# Patient Record
Sex: MALE | Race: WHITE | NOT HISPANIC OR LATINO | Employment: UNEMPLOYED | URBAN - METROPOLITAN AREA
[De-identification: names, ages, dates, MRNs, and addresses within clinical notes are randomized per-mention and may not be internally consistent; named-entity substitution may affect disease eponyms.]

---

## 2023-04-06 ENCOUNTER — OFFICE VISIT (OUTPATIENT)
Dept: FAMILY MEDICINE CLINIC | Facility: CLINIC | Age: 48
End: 2023-04-06

## 2023-04-06 VITALS
OXYGEN SATURATION: 97 % | HEART RATE: 57 BPM | TEMPERATURE: 98.2 F | HEIGHT: 72 IN | BODY MASS INDEX: 23.03 KG/M2 | SYSTOLIC BLOOD PRESSURE: 133 MMHG | DIASTOLIC BLOOD PRESSURE: 82 MMHG | WEIGHT: 170 LBS

## 2023-04-06 DIAGNOSIS — M77.02 MEDIAL EPICONDYLITIS OF ELBOW, LEFT: ICD-10-CM

## 2023-04-06 DIAGNOSIS — B35.1 TOENAIL FUNGUS: ICD-10-CM

## 2023-04-06 DIAGNOSIS — G89.29 OTHER CHRONIC PAIN: ICD-10-CM

## 2023-04-06 DIAGNOSIS — Z13.220 SCREENING, LIPID: ICD-10-CM

## 2023-04-06 DIAGNOSIS — R53.83 OTHER FATIGUE: ICD-10-CM

## 2023-04-06 DIAGNOSIS — M25.50 ARTHRALGIA OF MULTIPLE JOINTS: Primary | ICD-10-CM

## 2023-04-06 DIAGNOSIS — Z13.29 SCREENING FOR THYROID DISORDER: ICD-10-CM

## 2023-04-06 DIAGNOSIS — Z11.59 ENCOUNTER FOR HEPATITIS C SCREENING TEST FOR LOW RISK PATIENT: ICD-10-CM

## 2023-04-06 DIAGNOSIS — F32.2 SEVERE DEPRESSION (HCC): ICD-10-CM

## 2023-04-06 DIAGNOSIS — Z11.4 SCREENING FOR HIV WITHOUT PRESENCE OF RISK FACTORS: ICD-10-CM

## 2023-04-06 DIAGNOSIS — Z12.11 ENCOUNTER FOR SCREENING COLONOSCOPY: ICD-10-CM

## 2023-04-06 DIAGNOSIS — Z13.0 SCREENING FOR DEFICIENCY ANEMIA: ICD-10-CM

## 2023-04-06 DIAGNOSIS — Z13.1 SCREENING FOR DIABETES MELLITUS: ICD-10-CM

## 2023-04-06 NOTE — ASSESSMENT & PLAN NOTE
Check lyme, ARELY, CRP  Discussed fibromyalgia is more a diagnosis of exclusion  Can try Cymbalta to help with pain and depression- would like to wait until labs are completed

## 2023-04-06 NOTE — ASSESSMENT & PLAN NOTE
No suicidal thoughts  Likely caused by pain and fatigue he agrees- he is upset he doesn't feel well all the time  Discussed medications and he declines at this time, we will work up other symptoms and treat those to see if depression improves

## 2023-04-06 NOTE — PROGRESS NOTES
Name: Luzmaria Card      : 1975      MRN: [id number]  Encounter Provider: EVA Decker  Encounter Date: 2023   Encounter department: [facility]     1  Arthralgia of multiple joints  Assessment & Plan:  Check lyme, ARELY, CRP  Discussed fibromyalgia is more a diagnosis of exclusion  Can try Cymbalta to help with pain and depression- would like to wait until labs are completed    Orders:  -     Lyme Disease Serology w/Reflex; Future  -     ARELY w/Reflex if Positive; Future  -     C-reactive protein; Future  -     Lyme Disease Serology w/Reflex  -     ARELY w/Reflex if Positive  -     C-reactive protein    2  Other fatigue  Assessment & Plan:  Check labs could be multi-factorial with pain, depression  Check labs    Orders:  -     Lyme Disease Serology w/Reflex; Future  -     C-reactive protein; Future  -     Lyme Disease Serology w/Reflex  -     C-reactive protein    3  Toenail fungus  Assessment & Plan: Thickened, yellow nails on left foot  Check liver tests  To consider terbinafine if normal LFTs      4  Medial epicondylitis of elbow, left  Assessment & Plan:  Wear elbow strap for counter pressure  Avoid repetitive movements      5  Other chronic pain  Assessment & Plan:  Discussed cymbalta for treatment of depression and pain  Would like to hold off on medications until lab results are received      6  Encounter for screening colonoscopy  -     Ambulatory referral for colonoscopy; Future    7  Screening, lipid  -     Lipid panel; Future  -     Lipid panel    8  Screening for diabetes mellitus  -     Comprehensive metabolic panel; Future  -     Comprehensive metabolic panel    9  Screening for deficiency anemia  -     CBC and differential; Future  -     CBC and differential    10  Screening for thyroid disorder  -     TSH, 3rd generation with Free T4 reflex; Future  -     TSH, 3rd generation with Free T4 reflex    11   Screening for HIV without presence of risk factors  -     HIV-1 RNA, quantitative, PCR; Future  -     HIV-1 RNA, quantitative, PCR    12  Encounter for hepatitis C screening test for low risk patient  -     Hepatitis C RNA, quantitative, PCR; Future  -     Hepatitis C RNA, quantitative, PCR    13  Severe depression ([address] )  Assessment & Plan:  No suicidal thoughts  Likely caused by pain and fatigue he agrees- he is upset he doesn't feel well all the time  Discussed medications and he declines at this time, we will work up other symptoms and treat those to see if depression improves        Depression Screening and Follow-up Plan: Patient's depression screening was positive with a PHQ-2 score of 5  Their PHQ-9 score was 20  Depression likely due to other medical condition  Will treat underlying condition  He is overwhelmed from his physical ailments  No suicidal thoughts or ideations, has never had a plan to hurt himself  Subjective      Here today as a new patient, establishing care  Has multiple complaints to review  Hasnt seen a provider in years  Has a history of lyme, bulls eye rash and two weeks of antibiotics given, 1st episode 2007, and then again in about 2014 want to get his lyme done pend his blood work  Tired all the time     Has had two doctors tell him he has fibromyalgia but has never been fully or officially diagnosed    Dealing with his sinus and always congested at night and takes the allergies medicine- allergies all the time  Has been taking benadryl has tried non drowsy ones as well without improvement  Doesn't use nasal sprays consistently  Didn't like the taste of them when he would use them  Gets recurring sinus infections  Most recently 3-4 weeks ago  Has never had allergy testing done  Saw a holistic doctor in past and was tested for allergies- with some food allergies- artifical sweeteners        Left foot toenail fungus has never been treated in the past  Spoke with doctor about treatment but has avoided due to GI symptoms at the time  Been going to the gym left elbow hurts been going on years- tries to exercise 4-5x weekly  Has pain in left elbow and forearm, hurts with flexion at elbow  He tries to take probioitc daily- has EGD in past for hiatal hernia years ago  Has tries to monitor his diet to avoid triggers  Used to be on nexium but feels he is managing it well  Review of Systems   Constitutional: Positive for fatigue  Negative for fever  HENT: Positive for congestion, postnasal drip, rhinorrhea, sinus pressure and sneezing  Respiratory: Negative  Cardiovascular: Negative  Gastrointestinal: Negative  Occasional dyspepsia   Genitourinary: Negative  Musculoskeletal: Negative  Neurological: Negative  Hematological: Negative  Psychiatric/Behavioral: Positive for dysphoric mood  Negative for sleep disturbance  The patient is nervous/anxious  No current outpatient medications on file prior to visit  Objective     /82   Pulse 57   Temp 98 2 °F (36 8 °C) (Tympanic)   Ht 6' (1 829 m)   Wt 77 1 kg (170 lb)   SpO2 97%   BMI 23 06 kg/m²     Physical Exam  Vitals and nursing note reviewed  Constitutional:       Appearance: Normal appearance  He is normal weight  HENT:      Head: Normocephalic  Right Ear: Tympanic membrane, ear canal and external ear normal       Nose: Nose normal       Mouth/Throat:      Mouth: Mucous membranes are moist       Pharynx: Oropharynx is clear  Eyes:      Extraocular Movements: Extraocular movements intact  Conjunctiva/sclera: Conjunctivae normal       Pupils: Pupils are equal, round, and reactive to light  Cardiovascular:      Rate and Rhythm: Normal rate and regular rhythm  Pulses:           Radial pulses are 1+ on the right side and 1+ on the left side  Heart sounds: Normal heart sounds  Pulmonary:      Effort: Pulmonary effort is normal       Breath sounds: Normal breath sounds     Abdominal: General: Abdomen is flat  Bowel sounds are normal       Palpations: Abdomen is soft  Musculoskeletal:      Cervical back: Normal range of motion  Right lower leg: No edema  Left lower leg: No edema  Lymphadenopathy:      Cervical: No cervical adenopathy  Skin:     General: Skin is warm and dry  Capillary Refill: Capillary refill takes less than 2 seconds  Neurological:      Mental Status: He is alert and oriented to person, place, and time     Psychiatric:         Mood and Affect: Mood normal          Behavior: Behavior normal        Lucia Alba

## 2023-04-06 NOTE — ASSESSMENT & PLAN NOTE
Discussed cymbalta for treatment of depression and pain  Would like to hold off on medications until lab results are received

## 2023-11-25 LAB — HEMOCCULT STL QL IA: NEGATIVE

## 2023-11-27 ENCOUNTER — TELEPHONE (OUTPATIENT)
Dept: FAMILY MEDICINE CLINIC | Facility: CLINIC | Age: 48
End: 2023-11-27

## 2023-11-27 NOTE — TELEPHONE ENCOUNTER
----- Message from Helga Mcmullen DO sent at 11/27/2023  4:49 PM EST -----  No mychart  His stool test is negative.  Repeat in 1 year

## 2023-12-28 ENCOUNTER — OFFICE VISIT (OUTPATIENT)
Dept: FAMILY MEDICINE CLINIC | Facility: CLINIC | Age: 48
End: 2023-12-28
Payer: COMMERCIAL

## 2023-12-28 VITALS
HEART RATE: 77 BPM | SYSTOLIC BLOOD PRESSURE: 142 MMHG | BODY MASS INDEX: 26.14 KG/M2 | HEIGHT: 72 IN | WEIGHT: 193 LBS | OXYGEN SATURATION: 97 % | DIASTOLIC BLOOD PRESSURE: 75 MMHG | TEMPERATURE: 97.3 F

## 2023-12-28 DIAGNOSIS — M25.50 ARTHRALGIA OF MULTIPLE JOINTS: ICD-10-CM

## 2023-12-28 DIAGNOSIS — Z00.00 ANNUAL PHYSICAL EXAM: Primary | ICD-10-CM

## 2023-12-28 DIAGNOSIS — D22.9 MULTIPLE ATYPICAL NEVI: ICD-10-CM

## 2023-12-28 DIAGNOSIS — R53.83 OTHER FATIGUE: ICD-10-CM

## 2023-12-28 DIAGNOSIS — F32.2 SEVERE DEPRESSION (HCC): ICD-10-CM

## 2023-12-28 DIAGNOSIS — G89.29 OTHER CHRONIC PAIN: ICD-10-CM

## 2023-12-28 PROCEDURE — 99214 OFFICE O/P EST MOD 30 MIN: CPT | Performed by: NURSE PRACTITIONER

## 2023-12-28 PROCEDURE — 99396 PREV VISIT EST AGE 40-64: CPT | Performed by: NURSE PRACTITIONER

## 2023-12-28 NOTE — ASSESSMENT & PLAN NOTE
Discussed cymbalta for treatment of depression and pain  Would like to hold off on medications until lab results are received   English

## 2023-12-28 NOTE — ASSESSMENT & PLAN NOTE
Did not check blood work ordered in April Check lyme, ARELY, CRP  Discussed fibromyalgia is more a diagnosis of exclusion  Can try Cymbalta to help with pain and depression-not interested in medication

## 2023-12-28 NOTE — PROGRESS NOTES
ADULT ANNUAL PHYSICAL  [facility]    NAME: Rani Alex  AGE: 48 y.o. SEX: male  : 1975     DATE: 2023     Assessment and Plan:     Problem List Items Addressed This Visit        Musculoskeletal and Integument    Multiple atypical nevi     Schedule with dermatology         Relevant Orders    Ambulatory Referral to Dermatology       Other    Arthralgia of multiple joints     Did not check blood work ordered in April Check lyme, ARELY, CRP  Discussed fibromyalgia is more a diagnosis of exclusion  Can try Cymbalta to help with pain and depression-not interested in medication         Other fatigue     Check labs could be multi-factorial with pain, depression  Check labs         Other chronic pain     Discussed cymbalta for treatment of depression and pain  Would like to hold off on medications until lab results are received         Severe depression (HCC)     No suicidal thoughts  Likely caused by pain and fatigue he agrees- he is upset he doesn't feel well all the time  Discussed medications and he declines at this time, we will work up other symptoms and treat those to see if depression improves        Other Visit Diagnoses     Annual physical exam    -  Primary    BMI 26.0-26.9,adult                Immunizations and preventive care screenings were discussed with patient today. Appropriate education was printed on patient's after visit summary.      Counseling:  Alcohol/drug use: discussed moderation in alcohol intake, the recommendations for healthy alcohol use, and avoidance of illicit drug use.  Dental Health: discussed importance of regular tooth brushing, flossing, and dental visits.  Injury prevention: discussed safety/seat belts, safety helmets, smoke detectors, carbon dioxide detectors, and smoking near bedding or upholstery.  Exercise: the importance of regular exercise/physical activity was discussed. Recommend exercise 3-5 times per week for  at least 30 minutes.     BMI Counseling: Body mass index is 26.18 kg/m². The BMI is above normal. Nutrition recommendations include encouraging healthy choices of fruits and vegetables, increasing intake of lean protein and reducing intake of cholesterol. Exercise recommendations include exercising 3-5 times per week and strength training exercises. Rationale for BMI follow-up plan is due to patient being overweight or obese.         No follow-ups on file.     Chief Complaint:     Chief Complaint   Patient presents with   • Physical Exam     Physical, mole on back and want to get it check       History of Present Illness:     Adult Annual Physical   Patient here for a comprehensive physical exam. The patient reports problems - multiple aches and pains.  .    Felt really good over the summer with muscle and joint pain- energy levels were great. Now that it is winter he has more joint pain, fatigue, he is cold all the time. Doesn't really want to take anything for the pain like cymbalta. Constant energy deficit. Doesn't sleep well.     Feels he can take better care of himself. He has gained weight in a healthy way.   Did not complete blood work from     Hasn't seen dermatology in 8-10 years in [place] when he came back from [place]. Has noticed some skin changes on his back in last 2-3 years-    Diet and Physical Activity  Diet/Nutrition: well balanced diet, consuming 3-5 servings of fruits/vegetables daily, and has tried to cut back on sugars and white flour .   Exercise:  was more active in the summer  .      Depression Screening  PHQ-2/9 Depression Screening    Little interest or pleasure in doing things: 0 - not at all  Feeling down, depressed, or hopeless: 1 - several days  Trouble falling or staying asleep, or sleeping too much: 2 - more than half the days  Feeling tired or having little energy: 2 - more than half the days  Poor appetite or overeatin - not at all  Feeling bad about yourself - or that  you are a failure or have let yourself or your family down: 2 - more than half the days  Trouble concentrating on things, such as reading the newspaper or watching television: 0 - not at all  Moving or speaking so slowly that other people could have noticed. Or the opposite - being so fidgety or restless that you have been moving around a lot more than usual: 0 - not at all  Thoughts that you would be better off dead, or of hurting yourself in some way: 1 - several days  PHQ-9 Score: 8   PHQ-9 Interpretation: Mild depression        General Health  Sleep: sleeps poorly and gets 4-6 hours of sleep on average.   Hearing: normal - bilateral.  Vision: previous LASIK surgery.   Dental: regular dental visits.        Health  Symptoms include: none       Review of Systems:     Review of Systems   Constitutional:  Positive for activity change and fatigue. Negative for appetite change and fever.   HENT:  Negative for congestion, postnasal drip, rhinorrhea, sinus pressure and sneezing.    Respiratory: Negative.     Cardiovascular: Negative.    Gastrointestinal: Negative.         Occasional dyspepsia   Genitourinary: Negative.    Musculoskeletal:  Positive for arthralgias, back pain and myalgias.   Skin:  Positive for color change.   Neurological: Negative.    Hematological: Negative.    Psychiatric/Behavioral:  Positive for dysphoric mood. Negative for sleep disturbance. The patient is nervous/anxious.       Past Medical History:     Past Medical History:   Diagnosis Date   • Fibromyalgia    • Hiatal hernia    • Lyme disease    • Scoliosis       Past Surgical History:     History reviewed. No pertinent surgical history.   Family History:     Family History   Problem Relation Age of Onset   • Skin cancer Father       Social History:     Social History     Socioeconomic History   • Marital status: Single     Spouse name: None   • Number of children: None   • Years of education: None   • Highest education level: None    Occupational History   • None   Tobacco Use   • Smoking status: Never     Passive exposure: Never   • Smokeless tobacco: Never   Vaping Use   • Vaping status: Every Day   Substance and Sexual Activity   • Alcohol use: Yes     Comment: social   • Drug use: Yes     Types: Marijuana   • Sexual activity: Yes     Partners: Female   Other Topics Concern   • None   Social History Narrative   • None     Social Determinants of Health     Financial Resource Strain: Not on file   Food Insecurity: Not on file   Transportation Needs: Not on file   Physical Activity: Not on file   Stress: Not on file   Social Connections: Not on file   Intimate Partner Violence: Not on file   Housing Stability: Not on file      Current Medications:     No current outpatient medications on file.     No current facility-administered medications for this visit.      Allergies:     No Known Allergies   Physical Exam:     /75   Pulse 77   Temp (!) 97.3 °F (36.3 °C) (Tympanic)   Ht 6' (1.829 m)   Wt 87.5 kg (193 lb)   SpO2 97%   BMI 26.18 kg/m²     Physical Exam  Vitals and nursing note reviewed.   Constitutional:       General: He is not in acute distress.     Appearance: Normal appearance. He is well-developed and normal weight.   HENT:      Head: Normocephalic and atraumatic.      Right Ear: Tympanic membrane, ear canal and external ear normal.      Left Ear: Tympanic membrane, ear canal and external ear normal.      Nose: Nose normal.      Mouth/Throat:      Mouth: Mucous membranes are moist.      Pharynx: Oropharynx is clear.   Eyes:      Conjunctiva/sclera: Conjunctivae normal.      Pupils: Pupils are equal, round, and reactive to light.   Cardiovascular:      Rate and Rhythm: Normal rate and regular rhythm.      Heart sounds: Normal heart sounds. No murmur heard.  Pulmonary:      Effort: Pulmonary effort is normal. No respiratory distress.      Breath sounds: Normal breath sounds.   Abdominal:      Palpations: Abdomen is soft.       Tenderness: There is no abdominal tenderness.   Musculoskeletal:         General: No swelling.      Cervical back: Neck supple.   Skin:     General: Skin is warm and dry.      Capillary Refill: Capillary refill takes less than 2 seconds.      Comments: Multiple nevi on back with asymmetric borders and coloring   Neurological:      Mental Status: He is alert and oriented to person, place, and time.   Psychiatric:         Mood and Affect: Mood normal.         Behavior: Behavior normal.          EVA [facility]

## 2024-01-26 ENCOUNTER — TELEPHONE (OUTPATIENT)
Dept: FAMILY MEDICINE CLINIC | Facility: CLINIC | Age: 49
End: 2024-01-26

## 2024-01-26 NOTE — TELEPHONE ENCOUNTER
Confirmation - Referral [id number]  Effective: 01/26/2024     Expires: 04/25/2024  Active  Referred From  [facility]  Specialty: [facility]  Group NPI: [id number]  Provider ID: [id number]  Tax ID: [id number]  [address] [place] [address]  Referred To  [facility]  Specialty: Dermatology  Group NPI: [id number]  Provider ID: [id number]  Tax ID: [id number]  This referral is valid at any location for the above group

## 2024-06-06 ENCOUNTER — OFFICE VISIT (OUTPATIENT)
Dept: FAMILY MEDICINE CLINIC | Facility: CLINIC | Age: 49
End: 2024-06-06
Payer: COMMERCIAL

## 2024-06-06 VITALS
HEART RATE: 91 BPM | BODY MASS INDEX: 22.62 KG/M2 | WEIGHT: 167 LBS | HEIGHT: 72 IN | DIASTOLIC BLOOD PRESSURE: 95 MMHG | SYSTOLIC BLOOD PRESSURE: 145 MMHG | OXYGEN SATURATION: 98 % | TEMPERATURE: 98.1 F

## 2024-06-06 DIAGNOSIS — F32.2 SEVERE DEPRESSION (HCC): ICD-10-CM

## 2024-06-06 DIAGNOSIS — A69.20 LYME DISEASE: Primary | ICD-10-CM

## 2024-06-06 PROCEDURE — 99213 OFFICE O/P EST LOW 20 MIN: CPT | Performed by: FAMILY MEDICINE

## 2024-06-06 RX ORDER — CEFUROXIME AXETIL 250 MG/1
250 TABLET ORAL EVERY 12 HOURS SCHEDULED
Qty: 42 TABLET | Refills: 0 | Status: SHIPPED | OUTPATIENT
Start: 2024-06-06 | End: 2024-06-27

## 2024-06-06 NOTE — PROGRESS NOTES
Subjective:   Chief Complaint   Patient presents with    Lyme Disease     Lyme tic bite on thigh itchy and burns   Sinus issues and chest and lungs           Patient ID: Rani Alex is a 49 y.o. male.    Here with a rash on the left hip after a tick bite.  Patient reports she has had Lyme disease in the past on several occasions and has been pretty sick with it        The following portions of the patient's history were reviewed and updated as appropriate: allergies, current medications, past family history, past medical history, past social history, past surgical history and problem list.    Review of Systems   Constitutional:  Negative for activity change, appetite change, chills, diaphoresis, fatigue and unexpected weight change.   HENT:  Negative for congestion, ear discharge, ear pain, hearing loss, nosebleeds and rhinorrhea.    Eyes:  Negative for pain, redness, itching and visual disturbance.   Respiratory:  Negative for cough, choking, chest tightness and shortness of breath.    Cardiovascular:  Negative for chest pain and leg swelling.   Gastrointestinal:  Negative for abdominal pain, blood in stool, constipation, diarrhea and nausea.   Endocrine: Negative for cold intolerance, polydipsia and polyphagia.   Genitourinary:  Negative for dysuria, frequency, hematuria and urgency.   Musculoskeletal:  Negative for arthralgias, back pain, gait problem, joint swelling, neck pain and neck stiffness.   Skin:  Positive for rash. Negative for color change.   Allergic/Immunologic: Negative for environmental allergies and food allergies.   Neurological:  Negative for dizziness, tremors, seizures, speech difficulty, numbness and headaches.   Hematological:  Negative for adenopathy. Does not bruise/bleed easily.   Psychiatric/Behavioral:  Negative for behavioral problems, dysphoric mood, hallucinations and self-injury.              Objective:  Vitals:    06/06/24 1707   BP: 145/95   Pulse: 91   Temp: 98.1 °F (36.7  °C)   TempSrc: Tympanic   SpO2: 98%   Weight: 75.8 kg (167 lb)   Height: 6' (1.829 m)      Physical Exam  Constitutional:       General: He is not in acute distress.     Appearance: He is well-developed. He is not diaphoretic.   HENT:      Head: Normocephalic and atraumatic.      Right Ear: External ear normal.      Left Ear: External ear normal.      Nose: Nose normal.      Mouth/Throat:      Pharynx: No oropharyngeal exudate.   Eyes:      General: No scleral icterus.        Right eye: No discharge.         Left eye: No discharge.      Conjunctiva/sclera: Conjunctivae normal.      Pupils: Pupils are equal, round, and reactive to light.   Neck:      Thyroid: No thyromegaly.   Cardiovascular:      Rate and Rhythm: Normal rate and regular rhythm.      Heart sounds: Normal heart sounds. No murmur heard.  Pulmonary:      Effort: Pulmonary effort is normal.      Breath sounds: Normal breath sounds. No wheezing or rales.   Abdominal:      General: Bowel sounds are normal.      Palpations: Abdomen is soft. There is no mass.      Tenderness: There is no abdominal tenderness. There is no guarding.   Musculoskeletal:         General: No tenderness. Normal range of motion.      Cervical back: Normal range of motion and neck supple.   Lymphadenopathy:      Cervical: No cervical adenopathy.   Skin:     General: Skin is warm and dry.      Findings: Rash present.   Neurological:      Mental Status: He is alert and oriented to person, place, and time.      Deep Tendon Reflexes: Reflexes are normal and symmetric.   Psychiatric:         Thought Content: Thought content normal.         Judgment: Judgment normal.           Assessment/Plan:    No problem-specific Assessment & Plan notes found for this encounter.       Diagnoses and all orders for this visit:    Lyme disease  -     cefuroxime (CEFTIN) 250 mg tablet; Take 1 tablet (250 mg total) by mouth every 12 (twelve) hours for 21 days    Severe depression (HCC)        He will take  cefuroxime 250 mg twice daily for 21 days.  He normally uses probiotics and will have yogurt on a regular basis.  He will avoid sun exposure.

## 2025-04-11 ENCOUNTER — OFFICE VISIT (OUTPATIENT)
Dept: FAMILY MEDICINE CLINIC | Facility: CLINIC | Age: 50
End: 2025-04-11

## 2025-04-11 VITALS
OXYGEN SATURATION: 98 % | HEART RATE: 124 BPM | BODY MASS INDEX: 22.62 KG/M2 | WEIGHT: 158 LBS | DIASTOLIC BLOOD PRESSURE: 104 MMHG | HEIGHT: 70 IN | TEMPERATURE: 97.2 F | SYSTOLIC BLOOD PRESSURE: 148 MMHG

## 2025-04-11 DIAGNOSIS — G89.29 OTHER CHRONIC PAIN: ICD-10-CM

## 2025-04-11 DIAGNOSIS — R53.83 OTHER FATIGUE: ICD-10-CM

## 2025-04-11 DIAGNOSIS — I16.1 HYPERTENSIVE EMERGENCY: ICD-10-CM

## 2025-04-11 DIAGNOSIS — Z13.29 SCREENING FOR THYROID DISORDER: ICD-10-CM

## 2025-04-11 DIAGNOSIS — Z13.220 SCREENING CHOLESTEROL LEVEL: ICD-10-CM

## 2025-04-11 DIAGNOSIS — Z13.0 SCREENING FOR DEFICIENCY ANEMIA: ICD-10-CM

## 2025-04-11 DIAGNOSIS — F32.1 MODERATE MAJOR DEPRESSION (HCC): Primary | ICD-10-CM

## 2025-04-11 DIAGNOSIS — Z13.1 SCREENING FOR DIABETES MELLITUS: ICD-10-CM

## 2025-04-11 DIAGNOSIS — M25.50 ARTHRALGIA OF MULTIPLE JOINTS: ICD-10-CM

## 2025-04-11 PROCEDURE — 99213 OFFICE O/P EST LOW 20 MIN: CPT | Performed by: NURSE PRACTITIONER

## 2025-04-11 NOTE — ASSESSMENT & PLAN NOTE
Check labs could be multi-factorial with pain, depression  Check labs    Orders:    TSH, 3rd generation with Free T4 reflex; Future

## 2025-04-11 NOTE — PROGRESS NOTES
Name: Rani Alex      : 1975      MRN: [id number]  Encounter Provider: EVA Aguilar  Encounter Date: 2025   Encounter department: [facility]  :  Assessment & Plan  Moderate major depression (HCC)  No suicidal thoughts  Likely caused by pain and fatigue he agrees- he is upset he doesn't feel well all the time  Discussed medications and he declines at this time, we will work up other symptoms and treat those to see if depression improves         Arthralgia of multiple joints  Did not check blood work ordered in 2023 Check lyme, ARELY, CRP  Discussed fibromyalgia is more a diagnosis of exclusion  Can try Cymbalta to help with pain and depression-not interested in medication    Orders:    C-reactive protein    ARELY Screen w/Reflex [place]; Future    Lyme Total AB W Reflex to IGM/IGG; Future    Other chronic pain  Discussed cymbalta for treatment of depression and pain  Would like to hold off on medications until lab results are received has not done since ordered in     Orders:    C-reactive protein    ARELY Screen w/Reflex [place]; Future    Lyme Total AB W Reflex to IGM/IGG; Future    Other fatigue  Check labs could be multi-factorial with pain, depression  Check labs    Orders:    TSH, 3rd generation with Free T4 reflex; Future    Screening for thyroid disorder    Orders:    TSH, 3rd generation with Free T4 reflex; Future    Screening cholesterol level    Orders:    Lipid panel; Future    Screening for deficiency anemia    Orders:    CBC and differential; Future    Screening for diabetes mellitus    Orders:    Comprehensive metabolic panel; Future    Hypertensive emergency  160/110 in office  He refuses medication, understands risks of stroke and heart attack he understands  Monitor BP at bring back numbers on Monday next week                History of Present Illness   He came to our office back in 2023 with multiple health concerns related to lyme, pain,  allergy symptoms.    He was ordered labs to check CBC, CMP, Lipid, TSH, HIV, Hep C, ARELY, Lyme, CRP and has never completed labs.    Has a history of lyme with bullseye rash and received 2 weeks of antibiotics in 2007, 2014 and most recently 6/2024      Has a history of a lumbar xray in his file dating back to 6/15/2016: normal, no radiographic evidence of acute fracture or destructive osseous lesion, no significant lumbar degenerative change, alignment is unremarkable, soft tissues appear unremarkable.    Wt Readings from Last 3 Encounters:  04/11/25 71.7 kg (158 lb)  06/06/24 75.8 kg (167 lb)  12/28/23 87.5 kg (193 lb)      He has met with the a  to talk about filing for disability.    He has issues with sleeping due to pain in his back.  Laying in bed makes neck, back and hips worse  He goes to chiropractor often- goes every 2-3 weeks and sometimes every week or daily.    In the last year he was able to exercise and go to the gym and started to feel well then was getting flares in his legs, arms. Had difficulty curling 10 lbs then improved.     He will take anti-inflammatories as needed which takes edge off with chiropractor to help with weakness and numbness in arms, over time has calmed down but still feels there.       He does have tendency of obsessing over things goes 0-100 and then flares and then it causes a major set.     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 Negative for appetite change and fever.   HENT:  Negative for congestion, postnasal drip, rhinorrhea, sinus pressure and sneezing.    Respiratory: Negative.     Cardiovascular: Negative.    Gastrointestinal: Negative.         Occasional dyspepsia   Genitourinary: Negative.    Musculoskeletal:  Positive for arthralgias, back pain and myalgias.  "  Skin:  Positive for color change.   Neurological: Negative.    Hematological: Negative.    Psychiatric/Behavioral:  Positive for dysphoric mood. Negative for sleep disturbance. The patient is nervous/anxious.        Objective   BP (!) 148/104   Pulse (!) 124   Temp (!) 97.2 °F (36.2 °C) (Tympanic)   Ht 5' 10.47\" (1.79 m)   Wt 71.7 kg (158 lb)   SpO2 98%   BMI 22.37 kg/m²      Physical Exam  Vitals and nursing note reviewed.   Constitutional:       General: He is not in acute distress.     Appearance: Normal appearance. He is well-developed and normal weight.   HENT:      Head: Normocephalic and atraumatic.      Right Ear: Tympanic membrane, ear canal and external ear normal.      Left Ear: Tympanic membrane, ear canal and external ear normal.      Nose: Nose normal.      Mouth/Throat:      Mouth: Mucous membranes are moist.      Pharynx: Oropharynx is clear.   Eyes:      Conjunctiva/sclera: Conjunctivae normal.      Pupils: Pupils are equal, round, and reactive to light.   Cardiovascular:      Rate and Rhythm: Regular rhythm. Tachycardia present.      Heart sounds: Normal heart sounds. No murmur heard.  Pulmonary:      Effort: Pulmonary effort is normal. No respiratory distress.      Breath sounds: Normal breath sounds.   Abdominal:      Palpations: Abdomen is soft.      Tenderness: There is no abdominal tenderness.   Musculoskeletal:         General: No swelling.      Cervical back: Neck supple.   Skin:     General: Skin is warm and dry.      Capillary Refill: Capillary refill takes less than 2 seconds.   Neurological:      Mental Status: He is alert and oriented to person, place, and time.   Psychiatric:         Attention and Perception: Attention normal.         Mood and Affect: Mood is anxious.         Speech: Speech is rapid and pressured.         Behavior: Behavior is hyperactive.         "

## 2025-04-11 NOTE — ASSESSMENT & PLAN NOTE
Discussed cymbalta for treatment of depression and pain  Would like to hold off on medications until lab results are received has not done since ordered in 2023    Orders:    C-reactive protein    ARELY Screen w/Reflex [place]; Future    Lyme Total AB W Reflex to IGM/IGG; Future

## 2025-04-11 NOTE — ASSESSMENT & PLAN NOTE
160/110 in office  He refuses medication, understands risks of stroke and heart attack he understands  Monitor BP at bring back numbers on Monday next week

## 2025-04-11 NOTE — ASSESSMENT & PLAN NOTE
Did not check blood work ordered in April 2023 Check lyme, ARELY, CRP  Discussed fibromyalgia is more a diagnosis of exclusion  Can try Cymbalta to help with pain and depression-not interested in medication    Orders:    C-reactive protein    ARELY Screen w/Reflex [place]; Future    Lyme Total AB W Reflex to IGM/IGG; Future

## 2025-04-11 NOTE — PATIENT INSTRUCTIONS
Bring blood pressure log on Monday  If still greater than 140/90 will again recommend blood pressure medicatin  If you develop chest pain shortness of breath, difficulty breathing, headache, dizziness, go to ER immediately    Check fasting labs

## 2025-04-15 ENCOUNTER — TELEPHONE (OUTPATIENT)
Dept: FAMILY MEDICINE CLINIC | Facility: CLINIC | Age: 50
End: 2025-04-15

## 2025-04-15 NOTE — TELEPHONE ENCOUNTER
Petey dropped off home blood pressure readings and BP readings at home have been:   4/12/25: 150/106 am and 120/72 PM  4/13/25: 132/81 am and 150/80 pm 104/60  4/14/25: 130/85    I did review record and would like him to contact our office if his BP is consistently >140/90. These readings are much better than the reading in the office. Would like follow up in office in 1 month for BP check

## 2025-04-15 NOTE — TELEPHONE ENCOUNTER
Relayed provider's message and schedule patient for may 12th. Asked him to keep log of the reading and call back if it id greater than >140/90.  KESHA

## 2025-05-12 ENCOUNTER — OFFICE VISIT (OUTPATIENT)
Dept: FAMILY MEDICINE CLINIC | Facility: CLINIC | Age: 50
End: 2025-05-12

## 2025-05-12 VITALS
WEIGHT: 154.4 LBS | SYSTOLIC BLOOD PRESSURE: 138 MMHG | OXYGEN SATURATION: 98 % | DIASTOLIC BLOOD PRESSURE: 86 MMHG | BODY MASS INDEX: 22.1 KG/M2 | TEMPERATURE: 97.5 F | HEIGHT: 70 IN | HEART RATE: 82 BPM

## 2025-05-12 DIAGNOSIS — R03.0 ELEVATED BLOOD PRESSURE READING IN OFFICE WITHOUT DIAGNOSIS OF HYPERTENSION: Primary | ICD-10-CM

## 2025-05-12 PROCEDURE — 99213 OFFICE O/P EST LOW 20 MIN: CPT | Performed by: NURSE PRACTITIONER

## 2025-05-12 NOTE — ASSESSMENT & PLAN NOTE
Significant improvement  Continue to monitor BP periodically  Low salt intake  Increase water intake  Check fasting labs  Contact office if persistently >140/90

## 2025-05-12 NOTE — PROGRESS NOTES
"Name: Rani Alex      : 1975      MRN: [id number]  Encounter Provider: EVA Aguilar  Encounter Date: 2025   Encounter department: [facility]  :  Assessment & Plan  Elevated blood pressure reading in office without diagnosis of hypertension  Significant improvement  Continue to monitor BP periodically  Low salt intake  Increase water intake  Check fasting labs  Contact office if persistently >140/90                History of Present Illness   Here today for BP check  His blood pressures at home are usually between 130-140s/high 80s. Usually higher in mornings then lowers  He is more active now with the weather being nicer    Hasn't done the blood work yet    No chest pain, no shortness of breath, no headaches.           Review of Systems   Constitutional:  Positive for activity change and fatigue. Negative for appetite change and fever.   HENT:  Negative for congestion, postnasal drip, rhinorrhea, sinus pressure and sneezing.    Respiratory: Negative.     Cardiovascular: Negative.    Gastrointestinal: Negative.         Occasional dyspepsia   Genitourinary: Negative.    Musculoskeletal:  Positive for arthralgias, back pain and myalgias.   Skin:  Positive for color change.   Neurological: Negative.    Hematological: Negative.    Psychiatric/Behavioral:  Positive for dysphoric mood. Negative for sleep disturbance. The patient is nervous/anxious.        Objective   /86   Pulse 82   Temp 97.5 °F (36.4 °C) (Tympanic)   Ht 5' 10\" (1.778 m)   Wt 70 kg (154 lb 6.4 oz)   SpO2 98%   BMI 22.15 kg/m²      Physical Exam  Vitals reviewed.   Constitutional:       General: He is not in acute distress.     Appearance: Normal appearance. He is normal weight.   HENT:      Head: Normocephalic.   Eyes:      Conjunctiva/sclera: Conjunctivae normal.      Pupils: Pupils are equal, round, and reactive to light.   Cardiovascular:      Rate and Rhythm: Normal rate and regular rhythm. "      Heart sounds: Normal heart sounds.   Pulmonary:      Effort: No respiratory distress.      Breath sounds: Normal breath sounds.   Musculoskeletal:      Cervical back: Normal range of motion.      Right lower leg: No edema.      Left lower leg: No edema.   Neurological:      Mental Status: He is alert.   Psychiatric:         Mood and Affect: Mood is anxious.         Behavior: Behavior is cooperative.